# Patient Record
Sex: FEMALE | Employment: OTHER | URBAN - METROPOLITAN AREA
[De-identification: names, ages, dates, MRNs, and addresses within clinical notes are randomized per-mention and may not be internally consistent; named-entity substitution may affect disease eponyms.]

---

## 2017-04-13 ENCOUNTER — OFFICE VISIT (OUTPATIENT)
Dept: FAMILY MEDICINE CLINIC | Age: 82
End: 2017-04-13

## 2017-04-13 VITALS
HEIGHT: 61 IN | DIASTOLIC BLOOD PRESSURE: 90 MMHG | SYSTOLIC BLOOD PRESSURE: 140 MMHG | WEIGHT: 129 LBS | RESPIRATION RATE: 18 BRPM | OXYGEN SATURATION: 97 % | BODY MASS INDEX: 24.35 KG/M2 | HEART RATE: 59 BPM | TEMPERATURE: 96.4 F

## 2017-04-13 DIAGNOSIS — E11.9 TYPE 2 DIABETES MELLITUS WITHOUT COMPLICATION, WITHOUT LONG-TERM CURRENT USE OF INSULIN (HCC): ICD-10-CM

## 2017-04-13 DIAGNOSIS — Z13.39 SCREENING FOR ALCOHOLISM: ICD-10-CM

## 2017-04-13 DIAGNOSIS — E03.9 ACQUIRED HYPOTHYROIDISM: ICD-10-CM

## 2017-04-13 DIAGNOSIS — Z00.00 MEDICARE ANNUAL WELLNESS VISIT, SUBSEQUENT: Primary | ICD-10-CM

## 2017-04-13 DIAGNOSIS — I10 ESSENTIAL HYPERTENSION: ICD-10-CM

## 2017-04-13 DIAGNOSIS — F02.80 ALZHEIMER'S DEMENTIA WITHOUT BEHAVIORAL DISTURBANCE, UNSPECIFIED TIMING OF DEMENTIA ONSET: ICD-10-CM

## 2017-04-13 DIAGNOSIS — G30.9 ALZHEIMER'S DEMENTIA WITHOUT BEHAVIORAL DISTURBANCE, UNSPECIFIED TIMING OF DEMENTIA ONSET: ICD-10-CM

## 2017-04-13 DIAGNOSIS — Z00.00 ROUTINE GENERAL MEDICAL EXAMINATION AT A HEALTH CARE FACILITY: ICD-10-CM

## 2017-04-13 NOTE — PATIENT INSTRUCTIONS
[facility], [address] [place] [address]   [phone]    Contact the above number    Continue atorvastatin for now. But I will be changing this to pravastatin at a lower dose 20mg daily    For diabetes- Start Cinnamon capsules 2 grams a day    Follow up in 1 year     Helping a Person With Alzheimer's Disease: Care Instructions  Your Care Instructions  Alzheimer's disease is a type of dementia. It affects memory, intelligence, judgment, language, and behavior. It is not clear what causes this disease. But it is the most common form of dementia in older adults. It may take many years to develop. Alzheimer's disease is different than mild memory loss that occurs with aging. Family members usually notice symptoms first. But the person also may realize that something is wrong. Follow-up care is a key part of your loved one's treatment and safety. Be sure to make and go to all appointments, and call your doctor if your loved one is having problems. It's also a good idea to know your loved one's test results and keep a list of the medicines he or she takes. How can you care for your loved one at home? · Develop a routine. The person will feel less frustrated or confused with a clear, simple daily plan. Remind him or her about important facts and events. · Be patient. It may take longer for the person to complete a task than it used to. · Help the person eat a balanced diet. Serve plenty of whole grains, fruits, and vegetables every day. If the person is not eating well at mealtimes, give snacks at midmorning and in the afternoon. Offer drinks such as Boost, Ensure, or Sustacal if he or she is losing weight. · Encourage exercise. Walking and other activity may slow the decline of mental ability. Help the person keep an active mind. Encourage hobbies such as reading and crossword puzzles. · Take steps to help if the person is sundowning.  This is the restless behavior and trouble with sleeping that may occur in late afternoon and at night. Try not to let the person nap during the day. Offer a glass of warm milk or caffeine-free tea before bedtime. · Ask family members and friends for help. You may need breaks where others can help care for the person. · Talk to the person's doctor about what resources are available for help in your area. · Review all of the person's medicines with his or her doctor. · For as long as the person is able, allow him or her to make decisions about activities, food, clothing, and other choices. Let the person be independent, even if tasks take more time or are not done perfectly. Tailor tasks to the person's abilities. For example, if cooking is no longer safe, ask for other help. He or she can help set the table or make simple dishes such as a salad. When the person needs help, offer it gently. Keeping safe  · Make your home (or the person's home) safe. Tack down rugs, and put no-slip tape in the tub. Install handrails, and put safety switches on stoves and appliances. Keep rooms free of clutter. Make sure walkways around furniture are clear. Do not move furniture around, because the person may become confused. · Use locks on doors and cupboards. Lock up knives, scissors, medicines, cleaning supplies, and other dangerous things. · Do not let the person drive or cook if he or she cannot do it safely. A person with Alzheimer's should not drive unless he or she is able to pass an on-road driving test. Your state 's license bureau can do a driving test if there is any question. · Get medical alert jewelry for the person so you can be contacted if he or she wanders away. If possible, provide a safe place for wandering, such as an enclosed yard or garden. When should you call for help? Call 911 anytime you think you may need emergency care. For example, call if:  · A person who has Alzheimer's disease has disappeared.   · A person who has Alzheimer's disease is seriously injured. Call your doctor now or seek immediate medical care if:  · The person you are caring for suddenly sees or hears things that are not there (hallucinates). · The person you are caring for has a sudden, drastic change in his or her behavior. Watch closely for changes in your loved one's health, and be sure to contact the doctor if:  · A person who has Alzheimer's disease gradually gets worse or has symptoms that could cause injury. · You need help caring for a person with Alzheimer's disease. · The person has problems with his or her medicine. Where can you learn more? Go to http://benedict-panchito.info/. Enter S876 in the search box to learn more about \"Helping a Person With Alzheimer's Disease: Care Instructions. \"  Current as of: July 26, 2016  Content Version: 11.2  © 7168-1779 Healthwise, Incorporated. Care instructions adapted under license by [employer] (which disclaims liability or warranty for this information). If you have questions about a medical condition or this instruction, always ask your healthcare professional. [address] any warranty or liability for your use of this information.

## 2017-04-13 NOTE — PROGRESS NOTES
Carlin Alarcon is a 80 y.o. female here for [address] Granddaughter is caretaker and is with her today. Unable to complete PHQ2 and Abuse screening, pt is nonverbal.     Fall Risk Assessment, last 12 mths 4/13/2017   Able to walk?  No   Fall in past 12 months? -     ADL Assessment 4/13/2017   Feeding yourself No Help Needed   Getting from bed to chair No Help Needed   Getting dressed Help Needed   Bathing or showering Help Needed   Walk across the room (includes cane/walker) Help Needed   Using the telphone Help Needed   Taking your medications Help Needed   Preparing meals Help Needed   Managing money (expenses/bills) Help Needed   Moderately strenuous housework (laundry) Help Needed   Shopping for personal items (toiletries/medicines) Help Needed   Shopping for groceries Help Needed   Driving Help Needed   Climbing a flight of stairs Help Needed   Getting to places beyond walking distances Help Needed

## 2017-04-13 NOTE — MR AVS SNAPSHOT
Visit Information Date & Time Provider Department Dept. Phone Encounter #  
 4/13/2017  4:00 PM Eyal Ramirez [address]  [phone] [id number] Follow-up Instructions Return in about 1 year (around 4/13/2018) for annual exam. Upcoming Health Maintenance Date Due DTaP/Tdap/Td series (1 - Tdap) 4/26/1951 ZOSTER VACCINE AGE 60> 4/26/1990 GLAUCOMA SCREENING Q2Y 4/26/1995 OSTEOPOROSIS SCREENING (DEXA) 4/26/1995 Pneumococcal 65+ Low/Medium Risk (2 of 2 - PCV13) 12/10/2014 INFLUENZA AGE 9 TO ADULT 8/1/2016 MEDICARE YEARLY EXAM 1/25/2017 Allergies as of 4/13/2017  Review Complete On: 4/13/2017 By: Berna Melendez, CECILIA, RT, NM, ARRT No Known Allergies Current Immunizations  Never Reviewed Name Date Influenza Vaccine 12/18/2014, 12/10/2013, 12/7/2012 Pneumococcal Polysaccharide (PPSV-23) 12/10/2013 Not reviewed this visit You Were Diagnosed With   
  
 Codes Comments Alzheimer's dementia without behavioral disturbance, unspecified timing of dementia onset    -  Primary ICD-10-CM: G30.9, F02.80 ICD-9-CM: 331.0, 294.10 Essential hypertension     ICD-10-CM: I10 
ICD-9-CM: 401.9 Acquired hypothyroidism     ICD-10-CM: E03.9 ICD-9-CM: 796. 9 Type 2 diabetes mellitus without complication, without long-term current use of insulin (HCC)     ICD-10-CM: E11.9 ICD-9-CM: 250.00 Vitals BP Pulse Temp Resp Height(growth percentile) Weight(growth percentile) 140/90 (BP 1 Location: Right arm, BP Patient Position: Sitting) (!) 59 96.4 °F (35.8 °C) (Oral) 18 5' 1\" (1.549 m) 129 lb (58.5 kg) SpO2 BMI OB Status Smoking Status 97% 24.37 kg/m2 Postmenopausal Former Smoker BMI and BSA Data Body Mass Index Body Surface Area  
 24.37 kg/m 2 1.59 m 2 Preferred Pharmacy Pharmacy Name Phone [id number] [phone]  [phone] Your Updated Medication List  
  
   
This list is accurate as of: 4/13/17  5:11 PM.  Always use your most recent med list.  
  
  
  
  
 aspirin delayed-release 81 mg tablet Take  by mouth daily. atorvastatin 40 mg tablet Commonly known as:  LIPITOR  
TAKE 1 TABLET BY MOUTH ONCE A NIGHT  
  
 ciprofloxacin HCl 500 mg tablet Commonly known as:  CIPRO Take 1 Tab by mouth two (2) times a day. docusate sodium 100 mg capsule Commonly known as:  Isadora Ehrich Take 100 mg by mouth two (2) times a day. FLUoxetine 20 mg capsule Commonly known as:  PROzac TAKE 1 CAPSULE BY MOUTH ONCE A DAY  
  
 levothyroxine 88 mcg tablet Commonly known as:  SYNTHROID  
TAKE 1 TABLET BY MOUTH EVERY OTHER DAY  
  
 lisinopril 10 mg tablet Commonly known as:  Nael Headings Take 1 Tab by mouth daily. metoprolol tartrate 25 mg tablet Commonly known as:  LOPRESSOR  
TAKE 1 TABLET BY MOUTH TWICE A DAY Follow-up Instructions Return in about 1 year (around 4/13/2018) for annual exam. To-Do List   
 04/13/2017 Lab:  HEMOGLOBIN A1C WITH EAG   
  
 04/13/2017 Lab:  METABOLIC PANEL, COMPREHENSIVE   
  
 04/13/2017 Lab:  TSH 3RD GENERATION Patient Instructions [facility] [address] [place] [address], Shalom Palmer, [address] [place] [address]  
[phone] Contact the above number Continue atorvastatin for now. But I will be changing this to pravastatin at a lower dose 20mg daily For diabetes- Start Cinnamon capsules 2 grams a day Follow up in 1 year Helping a Person With Alzheimer's Disease: Care Instructions Your Care Instructions Alzheimer's disease is a type of dementia. It affects memory, intelligence, judgment, language, and behavior. It is not clear what causes this disease. But it is the most common form of dementia in older adults. It may take many years to develop.  
Alzheimer's disease is different than mild memory loss that occurs with aging. Family members usually notice symptoms first. But the person also may realize that something is wrong. Follow-up care is a key part of your loved one's treatment and safety. Be sure to make and go to all appointments, and call your doctor if your loved one is having problems. It's also a good idea to know your loved one's test results and keep a list of the medicines he or she takes. How can you care for your loved one at home? · Develop a routine. The person will feel less frustrated or confused with a clear, simple daily plan. Remind him or her about important facts and events. · Be patient. It may take longer for the person to complete a task than it used to. · Help the person eat a balanced diet. Serve plenty of whole grains, fruits, and vegetables every day. If the person is not eating well at mealtimes, give snacks at midmorning and in the afternoon. Offer drinks such as Boost, Ensure, or Sustacal if he or she is losing weight. · Encourage exercise. Walking and other activity may slow the decline of mental ability. Help the person keep an active mind. Encourage hobbies such as reading and crossword puzzles. · Take steps to help if the person is sundowning. This is the restless behavior and trouble with sleeping that may occur in late afternoon and at night. Try not to let the person nap during the day. Offer a glass of warm milk or caffeine-free tea before bedtime. · Ask family members and friends for help. You may need breaks where others can help care for the person. · Talk to the person's doctor about what resources are available for help in your area. · Review all of the person's medicines with his or her doctor. · For as long as the person is able, allow him or her to make decisions about activities, food, clothing, and other choices. Let the person be independent, even if tasks take more time or are not done perfectly. Tailor tasks to the person's abilities. For example, if cooking is no longer safe, ask for other help. He or she can help set the table or make simple dishes such as a salad. When the person needs help, offer it gently. Keeping safe · Make your home (or the person's home) safe. Tack down rugs, and put no-slip tape in the tub. Install handrails, and put safety switches on stoves and appliances. Keep rooms free of clutter. Make sure walkways around furniture are clear. Do not move furniture around, because the person may become confused. · Use locks on doors and cupboards. Lock up knives, scissors, medicines, cleaning supplies, and other dangerous things. · Do not let the person drive or cook if he or she cannot do it safely. A person with Alzheimer's should not drive unless he or she is able to pass an on-road driving test. Your state 's license bureau can do a driving test if there is any question. · Get medical alert jewelry for the person so you can be contacted if he or she wanders away. If possible, provide a safe place for wandering, such as an enclosed yard or garden. When should you call for help? Call 911 anytime you think you may need emergency care. For example, call if: · A person who has Alzheimer's disease has disappeared. · A person who has Alzheimer's disease is seriously injured. Call your doctor now or seek immediate medical care if: · The person you are caring for suddenly sees or hears things that are not there (hallucinates). · The person you are caring for has a sudden, drastic change in his or her behavior. Watch closely for changes in your loved one's health, and be sure to contact the doctor if: · A person who has Alzheimer's disease gradually gets worse or has symptoms that could cause injury. · You need help caring for a person with Alzheimer's disease. · The person has problems with his or her medicine. Where can you learn more? Go to http://benedict-panchito.info/. Enter X299 in the search box to learn more about \"Helping a Person With Alzheimer's Disease: Care Instructions. \" Current as of: July 26, 2016 Content Version: 11.2 © 7758-6564 [employer]. Care instructions adapted under license by [employer] (which disclaims liability or warranty for this information). If you have questions about a medical condition or this instruction, always ask your healthcare professional. [address] any warranty or liability for your use of this information. Introducing [facility]! Rosario Barney introduces [employer] patient portal. Now you can access parts of your medical record, email your doctor's office, and request medication refills online. 1. In your internet browser, go to [email or web address] 2. Click on the First Time User? Click Here link in the Sign In box. You will see the New Member Sign Up page. 3. Enter your [employer] Access Code exactly as it appears below. You will not need to use this code after youve completed the sign-up process. If you do not sign up before the expiration date, you must request a new code. · [employer] Access Code: [email or web address] Expires: 7/12/2017  5:04 PM 
 
4. Enter the last four digits of your Social Security Number (xxxx) and Date of Birth (mm/dd/yyyy) as indicated and click Submit. You will be taken to the next sign-up page. 5. Create a [employer] ID. This will be your [employer] login ID and cannot be changed, so think of one that is secure and easy to remember. 6. Create a [employer] password. You can change your password at any time. 7. Enter your Password Reset Question and Answer. This can be used at a later time if you forget your password. 8. Enter your e-mail address. You will receive e-mail notification when new information is available in [address] 9. Click Sign Up. You can now view and download portions of your medical record. 10. Click the Download Summary menu link to download a portable copy of your medical information. If you have questions, please visit the Frequently Asked Questions section of the [employer] website. Remember, [employer] is NOT to be used for urgent needs. For medical emergencies, dial 911. Now available from your iPhone and Android! Please provide this summary of care documentation to your next provider. Your primary care clinician is listed as Lillian Quach. If you have any questions after today's visit, please call [phone].

## 2017-04-14 NOTE — PROGRESS NOTES
This is a Subsequent Medicare Annual Wellness Visit providing Personalized Prevention Plan Services (PPPS) (Performed 12 months after initial AWV and PPPS )    I have reviewed the patient's medical history in detail and updated the computerized patient record. History     Past Medical History:   Diagnosis Date    Dementia     Diabetes ([facility] [address]     Hypercholesterolemia     Hypertension     Stroke ([facility] [address]     1990'S      Past Surgical History:   Procedure Laterality Date    HX ORTHOPAEDIC Right     R hip fx     Current Outpatient Prescriptions   Medication Sig Dispense Refill    FLUoxetine (PROZAC) 20 mg capsule TAKE 1 CAPSULE BY MOUTH ONCE A DAY 90 Cap 1    metoprolol tartrate (LOPRESSOR) 25 mg tablet TAKE 1 TABLET BY MOUTH TWICE A  Tab 1    levothyroxine (SYNTHROID) 88 mcg tablet TAKE 1 TABLET BY MOUTH EVERY OTHER DAY 90 Tab 0    atorvastatin (LIPITOR) 40 mg tablet TAKE 1 TABLET BY MOUTH ONCE A NIGHT 90 Tab 1    docusate sodium (COLACE) 100 mg capsule Take 100 mg by mouth two (2) times a day.  aspirin delayed-release 81 mg tablet Take  by mouth daily.  ciprofloxacin HCl (CIPRO) 500 mg tablet Take 1 Tab by mouth two (2) times a day. 8 Tab 0    lisinopril (PRINIVIL, ZESTRIL) 10 mg tablet Take 1 Tab by mouth daily. 30 Tab 0     No Known Allergies  No family history on file. Social History   Substance Use Topics    Smoking status: Former Smoker    Smokeless tobacco: Never Used    Alcohol use No     Patient Active Problem List   Diagnosis Code    Medicare annual wellness visit, subsequent Z00.00    Dementia F03.90    Hyperlipidemia E78.5    Essential hypertension I10    Acquired hypothyroidism E03.9    Advanced care planning/counseling discussion Z71.89       Depression Risk Factor Screening:   No flowsheet data found. Alcohol Risk Factor Screening: On any occasion during the past 3 months, have you had more than 3 drinks containing alcohol?   No    Do you average more than 7 drinks per week? No      Functional Ability and Level of Safety:     Hearing Loss   severe-to-profound    Activities of Daily Living   Total assistance. Requires assistance with: feeding, continence, grooming and dressing    Fall Risk     Fall Risk Assessment, last 12 mths 4/13/2017   Able to walk? No   Fall in past 12 months? -     Abuse Screen   Patient is not abused    Review of Systems   Review of systems not obtained due to patient factors. Physical Examination     Evaluation of Cognitive Function:  Mood/affect:  neutral  Appearance: age appropriate and casually dressed  Family member/caregiver input: Granddaughter- concerned for worsening dementia    Visit Vitals    /90 (BP 1 Location: Right arm, BP Patient Position: Sitting)    Pulse (!) 59    Temp 96.4 °F (35.8 °C) (Oral)    Resp 18    Ht 5' 1\" (1.549 m)    Wt 129 lb (58.5 kg)    SpO2 97%    BMI 24.37 kg/m2     General appearance: alert, cooperative, no distress, appears stated age  Head: Normocephalic, without obvious abnormality, atraumatic  Lungs: clear to auscultation bilaterally  Heart: regular rate and rhythm, S1, S2 normal, no murmur, click, rub or gallop  Abdomen: soft, non-tender. Bowel sounds normal. No masses,  no organomegaly  Extremities: extremities normal, atraumatic, no cyanosis or edema  Skin: Skin color, texture, turgor normal. No rashes or lesions    Patient Care Team:  [address] Kana Marx MD as PCP - General (Family Practice)    Advice/Referrals/Counseling   Education and counseling provided:  Are appropriate based on today's review and evaluation      Assessment/Plan       ICD-10-CM ICD-9-CM    1. Medicare annual wellness visit, subsequent Z00.00 V70.0    2. Alzheimer's dementia without behavioral disturbance, unspecified timing of dementia onset G30.9 331.0     F02.80 294.10    3. Essential hypertension C13 481.3 METABOLIC PANEL, COMPREHENSIVE   4. Acquired hypothyroidism E03.9 244.9 TSH 3RD GENERATION   5.  Type 2 diabetes mellitus without complication, without long-term current use of insulin (HCC) E11.9 250.00 HEMOGLOBIN A1C WITH EAG   .

## 2017-06-12 DIAGNOSIS — F03.90 DEMENTIA WITHOUT BEHAVIORAL DISTURBANCE (HCC): ICD-10-CM

## 2017-06-12 DIAGNOSIS — I10 ESSENTIAL HYPERTENSION: ICD-10-CM

## 2017-06-12 DIAGNOSIS — E78.5 HYPERLIPIDEMIA: ICD-10-CM

## 2017-06-12 RX ORDER — ATORVASTATIN CALCIUM 40 MG/1
TABLET, FILM COATED ORAL
Qty: 90 TAB | Refills: 1 | Status: SHIPPED | OUTPATIENT
Start: 2017-06-12 | End: 2017-12-23 | Stop reason: SDUPTHER

## 2017-06-12 RX ORDER — FLUOXETINE HYDROCHLORIDE 20 MG/1
CAPSULE ORAL
Qty: 90 CAP | Refills: 1 | Status: SHIPPED | OUTPATIENT
Start: 2017-06-12 | End: 2017-12-23 | Stop reason: SDUPTHER

## 2017-06-12 RX ORDER — METOPROLOL TARTRATE 25 MG/1
TABLET, FILM COATED ORAL
Qty: 180 TAB | Refills: 1 | Status: SHIPPED | OUTPATIENT
Start: 2017-06-12 | End: 2017-12-23 | Stop reason: SDUPTHER

## 2017-07-18 PROBLEM — S72.002A CLOSED LEFT HIP FRACTURE (HCC): Status: ACTIVE | Noted: 2017-07-18

## 2017-07-18 PROBLEM — S72.009A HIP FRACTURE REQUIRING OPERATIVE REPAIR (HCC): Status: ACTIVE | Noted: 2017-07-18

## 2017-07-25 ENCOUNTER — PATIENT OUTREACH (OUTPATIENT)
Dept: FAMILY MEDICINE CLINIC | Age: 82
End: 2017-07-25

## 2017-07-25 NOTE — PROGRESS NOTES
Patient transferred to [address] [place] [address] on 7/21/17 status post following a 4 day stay at [facility] after a left hip fracture. Will follow prn and once discharged.

## 2017-08-30 ENCOUNTER — TELEPHONE (OUTPATIENT)
Dept: FAMILY MEDICINE CLINIC | Age: 82
End: 2017-08-30

## 2017-09-11 ENCOUNTER — PATIENT OUTREACH (OUTPATIENT)
Dept: FAMILY MEDICINE CLINIC | Age: 82
End: 2017-09-11

## 2017-09-11 NOTE — PROGRESS NOTES
Spoke with staff at [facility]. Patient discharged from rehab facility on 9/6/17. Has appt with Dr. Yanci Cuellar on 9/12/17. Left message for granddaughter to return phone call to office at [phone].

## 2017-09-20 DIAGNOSIS — E03.9 ACQUIRED HYPOTHYROIDISM: ICD-10-CM

## 2017-09-21 RX ORDER — LEVOTHYROXINE SODIUM 88 UG/1
TABLET ORAL
Qty: 90 TAB | Refills: 0 | Status: SHIPPED | OUTPATIENT
Start: 2017-09-21 | End: 2019-01-09

## 2017-10-29 PROBLEM — N39.0 UTI (URINARY TRACT INFECTION): Status: ACTIVE | Noted: 2017-10-29

## 2017-10-29 PROBLEM — G93.40 ACUTE ENCEPHALOPATHY: Status: ACTIVE | Noted: 2017-10-29

## 2017-11-03 ENCOUNTER — PATIENT OUTREACH (OUTPATIENT)
Dept: FAMILY MEDICINE CLINIC | Age: 82
End: 2017-11-03

## 2017-11-03 NOTE — PROGRESS NOTES
Patient transferred to inpatient [facility] unit. Will follow up with patient and family once discharged.

## 2017-11-14 ENCOUNTER — PATIENT OUTREACH (OUTPATIENT)
Dept: FAMILY MEDICINE CLINIC | Age: 82
End: 2017-11-14

## 2017-11-14 NOTE — PROGRESS NOTES
Transitions  of Care    Nurse navigator spoke with Luis Tovar with [facility] in [place]. Care related that patient is receiving skilled care services. Discharge plans have not been completed as she has not been able to communicate with the family as of this time. Nurse Navigator will continue to follow case.

## 2017-11-21 ENCOUNTER — PATIENT OUTREACH (OUTPATIENT)
Dept: FAMILY MEDICINE CLINIC | Age: 82
End: 2017-11-21

## 2017-11-21 NOTE — PROGRESS NOTES
Transitions of [address] Discharge    Nurse Navigator (NN) attempted to contact social work staff at [facility] via  telephone call [phone]. There was no response. A voicemail message was left requesting a  return telephone call. Nurse Navigator contact information provided. Nurse Navigator attempted to call patient at her listed home phone number. A message was heard that I had reached [facility] and to please leave a message. Another message was heard that the voicemail box is full. Nurse Navigator will continue to follow case.

## 2017-11-24 ENCOUNTER — PATIENT OUTREACH (OUTPATIENT)
Dept: FAMILY MEDICINE CLINIC | Age: 82
End: 2017-11-24

## 2017-11-24 RX ORDER — BISMUTH SUBSALICYLATE 262 MG
1 TABLET,CHEWABLE ORAL DAILY
COMMUNITY
End: 2019-01-09

## 2017-11-24 NOTE — PROGRESS NOTES
Nurse Navigator attempted to reach staff with   [facility] in [place], [address] [facility]. There was no answser. Voicemail Message left for Ms. Erika azevedoing  a non-emergency return telephone call. Nurse Navigator called Mr. Blanca Morin. Mr. Tyler Elias answered the phone and stated that he is patient's grand-daughter's . Transition of [address] [place] [address] [facility] [place] Discharge 11-4-17  Transition to SNF:  [facility]       Patient was admitted to [facility] on 10-29-17. Discharge Diagnoses per Discharge Summary of  11-4-17 per Dr. Taz Calderon as follows  (in [place]afar 1721):     1. Acute metabolic encephalopathy, present on admission, resolved. 2.  Urinary tract infection. Will complete 3 more days of Cipro (a total of 7 days of antibiotics). 3.  Acute kidney injury, present on admission, now back to baseline. 4.  Dementia. 5.  Dyslipidemia. 6.  Type 2 diabetes. 7.  Hypertension. 8.  History of stroke. 9.  Hypothyroid. 10.  Depression. Inpatient RRAT = 20    Subjective    Per patient's grandson-in-law Mr. Blanca Morin  Provided the following information:     -Patient is home. - Patient needs much assistance. Patient can feed herself but needs assistance with ASLs and uses diapers full time . - Patient has a home health aide for 6 hours a day. - [facility] and son-in-law are both   employed and work in the [employer]. Patient's [facility] is trying to work on approval for  [employer]" for patient.     -Patient is able to walk with a walker to and from the bathroom. - Medications were not changed at time of discharge from the skilled nursing facility (per Blanca Morin) . Patient has meds. Available. - Patient has been ordered Oxygen in the past but is not currently using Oxygen. Mr. Tyler Elias reports that patient did not keep Oxygen on at the time when it was ordered.     - Mr. Tyler Elias reports that someone from [facility] came out unexpectedly. His wife spoke with this person. Follow Up Appointment:     Per MrAndrei Chavez please follow up with patient's [facility] re: PCP appointment follow up. Avoicemail message has been left for Ms. Tomás Dobson requesting a return telephone call. Med. Reconciliation:     Med. Reconciliatoin completed and allergies reviewed. Telephone Follow Up    Nurse Navigator contacted [facility]. Nurse Navigator was told that patient is not receiving home care services. Nurse Navigator left messages for patient's daughter and son-in-law requesting a non-emergency return telephone call. Telephone call was to follow up with appointment date for PCP and if patient has home care services post dc from SNF. Nurse Navigator will continue to follow case. Goals      Attends follow-up appointments as directed.  Prevent complications post hospitalization.  Supportive resources in place to maintain patient in the community (ie. Home Health, DME equipment, refer to, medication assistant plan, etc.)            11-24-17:  A home health aide is place for 6 hours daily to provide assistance to patient.      11-24-17:  Plan:  Follow up with patient's grand-daughter re: application for Medicaid and possible [facility] for additional services/LTC                        -

## 2017-12-01 ENCOUNTER — PATIENT OUTREACH (OUTPATIENT)
Dept: FAMILY MEDICINE CLINIC | Age: 82
End: 2017-12-01

## 2017-12-01 NOTE — PROGRESS NOTES
Transitions of Care    Nurse Navigator (NN) attempted to contact patient's grand-daughter, Jerica Montague, via telephone call. There was no response. A voicemail message was left requesting a non-emergency return telephone call. Nurse Navigator contact information provided.

## 2017-12-08 ENCOUNTER — PATIENT OUTREACH (OUTPATIENT)
Dept: FAMILY MEDICINE CLINIC | Age: 82
End: 2017-12-08

## 2017-12-08 NOTE — PROGRESS NOTES
Transition of Care    Nurse Navigator spoke with patient's family member Ms. Breezy Mckenna via telephone call. Nurse Navigator introduced self and role. Ms. Minda Samaniego stated that she was currently driving and that she would return call to nurse navigator. Nurse Navigator awaiting return telephone call.

## 2017-12-23 DIAGNOSIS — I10 ESSENTIAL HYPERTENSION: ICD-10-CM

## 2017-12-23 DIAGNOSIS — E78.5 HYPERLIPIDEMIA: ICD-10-CM

## 2017-12-23 DIAGNOSIS — F03.90 DEMENTIA WITHOUT BEHAVIORAL DISTURBANCE (HCC): ICD-10-CM

## 2017-12-26 RX ORDER — FLUOXETINE HYDROCHLORIDE 20 MG/1
CAPSULE ORAL
Qty: 90 CAP | Refills: 1 | Status: SHIPPED | OUTPATIENT
Start: 2017-12-26 | End: 2019-01-09

## 2017-12-26 RX ORDER — ATORVASTATIN CALCIUM 40 MG/1
TABLET, FILM COATED ORAL
Qty: 90 TAB | Refills: 1 | Status: SHIPPED | OUTPATIENT
Start: 2017-12-26 | End: 2019-01-09

## 2017-12-26 RX ORDER — METOPROLOL TARTRATE 25 MG/1
TABLET, FILM COATED ORAL
Qty: 180 TAB | Refills: 1 | Status: SHIPPED | OUTPATIENT
Start: 2017-12-26 | End: 2018-10-18

## 2018-01-03 ENCOUNTER — PATIENT OUTREACH (OUTPATIENT)
Dept: FAMILY MEDICINE CLINIC | Age: 83
End: 2018-01-03

## 2018-01-03 NOTE — PROGRESS NOTES
Transitions of Care    Nurse Navigator spoke with Ms. Didier Mead briefly via telephone call. Ms. Paula Noriega related that she is at work and this was not a good time to speak with her. Nurse navigator related this was not an emergency telephone call. Nurse Navigator contact information provided and requested a return telephone call by Ms. Sheriff when it is an opportune time. Nurse Navigator awaiting return telephone call.

## 2018-01-16 ENCOUNTER — PATIENT OUTREACH (OUTPATIENT)
Dept: FAMILY MEDICINE CLINIC | Age: 83
End: 2018-01-16

## 2018-02-02 ENCOUNTER — PATIENT OUTREACH (OUTPATIENT)
Dept: FAMILY MEDICINE CLINIC | Age: 83
End: 2018-02-02

## 2018-02-02 NOTE — PROGRESS NOTES
Transition of Care    Per EMR review patient has not been admitted to the acute care setting within 30 days from discharge or within the last 30 days. Bharti Yap RN Clinical Manager updated prior to closure. Transitional Care Episode Closed.

## 2018-09-09 PROBLEM — R65.10 SIRS (SYSTEMIC INFLAMMATORY RESPONSE SYNDROME) (HCC): Status: ACTIVE | Noted: 2018-09-09

## 2018-09-17 ENCOUNTER — PATIENT OUTREACH (OUTPATIENT)
Dept: FAMILY MEDICINE CLINIC | Age: 83
End: 2018-09-17

## 2018-09-17 NOTE — PROGRESS NOTES
Hospital Discharge Follow-Up Date/Time:  9/17/2018 11:06 AM 
 
Patient was admitted to [facility] on 9/9/18 and discharged on 9/13/18 for SIRS. The physician discharge summary was available at the time of outreach. Patient was contacted within 4 business days of discharge. Inpatient RRAT score: 14 Was this a readmission? [facility] ED 8/7/18 for acute cystitis/[facility] NN attempted to contact patient/caregiver at listed number. VM left identifying self. Direct contact information given with request for return call. NN will attempt to contact patient/caregiver at later time.

## 2018-09-18 ENCOUNTER — PATIENT OUTREACH (OUTPATIENT)
Dept: FAMILY MEDICINE CLINIC | Age: 83
End: 2018-09-18

## 2018-09-18 NOTE — PROGRESS NOTES
[facility] -18 SIRS 
 
NN attempted to contact patient's granddaughter at listed number. VM left identifying self. Direct contact information given with request for return call. NN will attempt to contact caregiver at later time. Hospital Discharge Follow-Up Date/Time:  2018 4:05 PM 
 
 
 
Nurse Navigator (NN) received return call from the patient's granddaughter, Omega Tong by telephone to perform post hospital discharge assessment. Verified name and  with Miss Rosa Elena Zarate as identifiers. Provided introduction to self, and explanation of the Nurse Navigator role. Reviewed discharge instructions and red flags with Miss Rosa Elena Zarate who verbalized understanding. Miss Rosa Elena Zarate given an opportunity to ask questions and does not have any further questions or concerns at this time. Miss Rosa Elena Zarate agrees to contact the PCP office for questions related to their healthcare. NN provided contact information for future reference. Disease Specific:   Sepsis Summary of patient's top problems: 
1. SIRS - Miss Rosa Elena Zarate states patient is \"doing well\", denies fever, chills Home Health orders at discharge: Miss Rosa Elena Zarate declined Home Health company: NA 
Date of initial visit: NA 
 
Durable Medical Equipment ordered/company: NA 
Durable Medical Equipment received: NA Barriers to care? None noted at this time Advance Care Planning:  
Does patient have an Advance Directive:  not on file Medication(s):  
New Medications at Discharge: yes Changed Medications at Discharge: no 
Discontinued Medications at Discharge: yes: Keflex Medication reconciliation was performed with Miss Rosa Elena Zarate, who verbalizes understanding of administration of home medications. There were no barriers to obtaining medications identified at this time. Referral to Pharm D needed: no  
 
Current Outpatient Prescriptions Medication Sig  potassium chloride SR (K-TAB) 20 mEq tablet Take 1 Tab by mouth daily. Indications: hypokalemia  magnesium oxide 250 mg tablet Take 1 Tab by mouth daily for 5 days.  levETIRAcetam (KEPPRA) 1,000 mg tablet Take 1 Tab by mouth two (2) times a day for 14 days. Indications: PARTIAL EPILEPSY TREATMENT ADJUNCT  
 metoprolol tartrate (LOPRESSOR) 25 mg tablet TAKE 1 TABLET BY MOUTH TWICE A DAY  FLUoxetine (PROZAC) 20 mg capsule TAKE 1 CAPSULE BY MOUTH ONCE A DAY  atorvastatin (LIPITOR) 40 mg tablet TAKE 1 TABLET BY MOUTH ONCE A NIGHT  multivitamin (ONE A DAY) tablet Take 1 Tab by mouth daily.  cinnamon bark (CINNAMON) 500 mg cap Take 1,000 mg by mouth.  levothyroxine (SYNTHROID) 88 mcg tablet TAKE 1 TABLET BY MOUTH EVERY OTHER DAY  docusate sodium (COLACE) 100 mg capsule Take 100 mg by mouth two (2) times a day.  aspirin delayed-release 81 mg tablet Take 81 mg by mouth daily. No current facility-administered medications for this visit. There are no discontinued medications. [facility] follow up appointment(s): Future Appointments Date Time Provider Ethel Porter 9/24/2018 1:00 PM [place] [address] [facility] follow up appointment(s): Dr. Juve Elliott. Linda Milling - 10/22/18 [facility]:  out of service area Goals  Understands red flags post discharge. By 9/25/18 the patient/caregiver will be able to identify signs/symptoms of sepsis Fever > 100.5 Low body temperature < 97.2 Chills or shaking Sweating Fast heart rate Fast breathing Dizziness/lightheadedness Confusion or unusual change in mental status Diarrhea Nausea and/or vomiting Shortness of breath or difficulty breathing Less urine output Cold, clammy, and pale skin Skin rash or skin color changes Patient/caregiver will verbalize understanding that if 2 or more of these symptoms are present then PCP should be notified of concerns for sepsis Miss Dewitt Shed states that there is no need for further follow up calls frmo NN. \"She has an appointment tomorrow, I am giving her the medications as ordered. If there are any changes I have your contact number\". NN will resolve this episode.

## 2018-09-24 ENCOUNTER — HOSPITAL ENCOUNTER (OUTPATIENT)
Dept: LAB | Age: 83
Discharge: HOME | End: 2018-09-24
Payer: MEDICARE

## 2018-09-24 ENCOUNTER — OFFICE VISIT (OUTPATIENT)
Dept: FAMILY MEDICINE CLINIC | Age: 83
End: 2018-09-24

## 2018-09-24 VITALS — SYSTOLIC BLOOD PRESSURE: 146 MMHG | HEART RATE: 55 BPM | DIASTOLIC BLOOD PRESSURE: 42 MMHG

## 2018-09-24 DIAGNOSIS — G40.909 SEIZURE DISORDER (HCC): Primary | ICD-10-CM

## 2018-09-24 DIAGNOSIS — G40.909 SEIZURE DISORDER (HCC): ICD-10-CM

## 2018-09-24 DIAGNOSIS — E87.6 HYPOKALEMIA: ICD-10-CM

## 2018-09-24 DIAGNOSIS — E83.42 HYPOMAGNESEMIA: ICD-10-CM

## 2018-09-24 DIAGNOSIS — F02.80 ALZHEIMER'S DEMENTIA WITHOUT BEHAVIORAL DISTURBANCE, UNSPECIFIED TIMING OF DEMENTIA ONSET: ICD-10-CM

## 2018-09-24 DIAGNOSIS — Z23 ENCOUNTER FOR IMMUNIZATION: ICD-10-CM

## 2018-09-24 DIAGNOSIS — G30.9 ALZHEIMER'S DEMENTIA WITHOUT BEHAVIORAL DISTURBANCE, UNSPECIFIED TIMING OF DEMENTIA ONSET: ICD-10-CM

## 2018-09-24 LAB
ANION GAP SERPL CALC-SCNC: 10 MMOL/L (ref 3–18)
BUN SERPL-MCNC: 18 MG/DL (ref 7–18)
BUN/CREAT SERPL: 11 (ref 12–20)
CALCIUM SERPL-MCNC: 9 MG/DL (ref 8.5–10.1)
CHLORIDE SERPL-SCNC: 99 MMOL/L (ref 100–108)
CO2 SERPL-SCNC: 27 MMOL/L (ref 21–32)
CREAT SERPL-MCNC: 1.6 MG/DL (ref 0.6–1.3)
GLUCOSE SERPL-MCNC: 139 MG/DL (ref 74–99)
MAGNESIUM SERPL-MCNC: 2.5 MG/DL (ref 1.6–2.6)
POTASSIUM SERPL-SCNC: 4.8 MMOL/L (ref 3.5–5.5)
SODIUM SERPL-SCNC: 136 MMOL/L (ref 136–145)

## 2018-09-24 PROCEDURE — 80048 BASIC METABOLIC PNL TOTAL CA: CPT | Performed by: FAMILY MEDICINE

## 2018-09-24 PROCEDURE — 80177 DRUG SCRN QUAN LEVETIRACETAM: CPT | Performed by: FAMILY MEDICINE

## 2018-09-24 PROCEDURE — 83735 ASSAY OF MAGNESIUM: CPT | Performed by: FAMILY MEDICINE

## 2018-09-24 NOTE — PROGRESS NOTES
ROOM # 7 Marilin Matrínez presents today for Chief Complaint Patient presents with  
[facility] Follow Up Pt went to hospital Sept. 09 for seizure. Pt lio states that she has symptoms of an \"episode\" last wednesday Marilin Martínez preferred language for health care discussion is english/other. Is someone accompanying this pt? Yes her great granddaughter Is the patient using any DME equipment during OV? Yes a wheelchair Depression Screening: PHQ over the last two weeks 9/24/2018 1/25/2016 PHQ Not Done Medical Reason (indicate in comments) Medical Reason (indicate in comments) Learning Assessment: 
Learning Assessment 1/25/2016 PRIMARY LEARNER Patient HIGHEST LEVEL OF EDUCATION - PRIMARY LEARNER  SOME COLLEGE  
BARRIERS PRIMARY LEARNER HEARING  
CO-LEARNER CAREGIVER Yes CO-LEARNER NAME Corrinne Orleans R Emelyn Mendez 75 SOME COLLEGE  
BARRIERS CO-LEARNER NONE PRIMARY LANGUAGE ENGLISH  
PRIMARY LANGUAGE CO-LEARNER ENGLISH  
LEARNER PREFERENCE PRIMARY DEMONSTRATION  
LEARNER PREFERENCE CO-LEARNER OTHER (COMMENT) LEARNING SPECIAL TOPICS no  
ANSWERED BY Corrinne Orleans RELATIONSHIP OTHER Abuse Screening: No flowsheet data found. Fall Risk Fall Risk Assessment, last 12 mths 9/24/2018 4/13/2017 1/25/2016 Able to walk? Yes No Yes Fall in past 12 months? No - No  
 
 
Health Maintenance reviewed and discussed per provider. Yes Marilin Martínez is due for Health Maintenance Due Topic Date Due  
 DTaP/Tdap/Td series (1 - Tdap) 04/26/1951  Shingrix Vaccine Age 50> (1 of 2) 04/26/1980  GLAUCOMA SCREENING Q2Y  04/26/1995  Bone Densitometry (Dexa) Screening  04/26/1995  MEDICARE YEARLY EXAM  04/14/2018  Influenza Age 5 to Adult  08/01/2018 Please order/place referral if appropriate. Advance Directive: 1. Do you have an advance directive in place?  Patient Reply: no 
 
 2. If not, would you like material regarding how to put one in place? Patient Reply: no 
 
Coordination of Care: 1. Have you been to the ER, urgent care clinic since your last visit? Hospitalized since your last visit? Yes for seizures two weeks ago 2. Have you seen or consulted any other health care providers outside of the [address] [place] [address] since your last visit? Include any pap smears or colon screening.  no

## 2018-09-24 NOTE — PATIENT INSTRUCTIONS
Please check to see if she had been using her thyroid medication regularly prior to hospital admission. Follow up with neurologist at the end of October. Influenza (Flu) Vaccine: Care Instructions Your Care Instructions Influenza (flu) is an infection in the lungs and breathing passages. It is caused by the influenza virus. There are different strains, or types, of the flu virus every year. The flu comes on quickly. It can cause a cough, stuffy nose, fever, chills, tiredness, and aches and pains. These symptoms may last up to 10 days. The flu can make you feel very sick, but most of the time it doesn't lead to other problems. But it can cause serious problems in people who are older or who have a long-term illness, such as heart disease or diabetes. You can help prevent the flu by getting a flu vaccine every year, as soon as it is available. You cannot get the flu from the vaccine. The vaccine prevents most cases of the flu. But even when the vaccine doesn't prevent the flu, it can make symptoms less severe and reduce the chance of problems from the flu. Sometimes, young children and people who have an immune system problem may have a slight fever or muscle aches or pains 6 to 12 hours after getting the shot. These symptoms usually last 1 or 2 days. Follow-up care is a key part of your treatment and safety. Be sure to make and go to all appointments, and call your doctor if you are having problems. It's also a good idea to know your test results and keep a list of the medicines you take. Who should get the flu vaccine? Everyone age 7 months or older should get a flu vaccine each year. It lowers the chance of getting and spreading the flu. The vaccine is very important for people who are at high risk for getting other health problems from the flu. This includes: · Anyone 48years of age or older. · People who live in a long-term care center, such as a nursing home. · All children 6 months through 25years of age. · Adults and children 6 months and older who have long-term heart or lung problems, such as asthma. · Adults and children 6 months and older who needed medical care or were in a hospital during the past year because of diabetes, chronic kidney disease, or a weak immune system (including HIV or AIDS). · Women who will be pregnant during the flu season. · People who have any condition that can make it hard to breathe or swallow (such as a brain injury or muscle disorders). · People who can give the flu to others who are at high risk for problems from the flu. This includes all health care workers and close contacts of people age 72 or older. Who should not get the flu vaccine? The person who gives the vaccine may tell you not to get it if you: 
· Have a severe allergy to eggs or any part of the vaccine. · Have had a severe reaction to a flu vaccine in the past. 
· Have had Guillain-Barré syndrome (GBS). · Are sick with a fever. (Get the vaccine when symptoms are gone.) How can you care for yourself at home? · If you or your child has a sore arm or a slight fever after the shot, take an over-the-counter pain medicine, such as acetaminophen (Tylenol) or ibuprofen (Advil, Motrin). Read and follow all instructions on the label. Do not give aspirin to anyone younger than 20. It has been linked to Reye syndrome, a serious illness. · Do not take two or more pain medicines at the same time unless the doctor told you to. Many pain medicines have acetaminophen, which is Tylenol. Too much acetaminophen (Tylenol) can be harmful. When should you call for help? Call 911 anytime you think you may need emergency care. For example, call if after getting the flu vaccine: 
  · You have symptoms of a severe reaction to the flu vaccine. Symptoms of a severe reaction may include: ¨ Severe difficulty breathing. ¨ Sudden raised, red areas (hives) all over your body. ¨ Severe lightheadedness.  
 Call your doctor now or seek immediate medical care if after getting the flu vaccine: 
  · You think you are having a reaction to the flu vaccine, such as a new fever.  
 Watch closely for changes in your health, and be sure to contact your doctor if you have any problems. Where can you learn more? Go to http://benedict-panchito.info/. Enter A250 in the search box to learn more about \"Influenza (Flu) Vaccine: Care Instructions. \" Current as of: October 6, 2017 Content Version: 11.7 © 3041-3391 [employer]. Care instructions adapted under license by [employer] (which disclaims liability or warranty for this information). If you have questions about a medical condition or this instruction, always ask your healthcare professional. [address] any warranty or liability for your use of this information.

## 2018-09-24 NOTE — MR AVS SNAPSHOT
[address] [facility] [address] [address] 
[phone] Patient: Verner Schwartz MRN: [id number] THU:4[birth year]10[birth year]12 Visit Information Date & Time Provider Department Dept. Phone Encounter #  
 9/24/2018  1:00 PM Abhay Sewell, [address] [phone] [id number] Follow-up Instructions Return in about 6 months (around 3/24/2019) for hypothyroidism. Upcoming Health Maintenance Date Due DTaP/Tdap/Td series (1 - Tdap) 4/26/1951 Shingrix Vaccine Age 50> (1 of 2) 4/26/1980 GLAUCOMA SCREENING Q2Y 4/26/1995 Bone Densitometry (Dexa) Screening 4/26/1995 MEDICARE YEARLY EXAM 4/14/2018 Influenza Age 5 to Adult 8/1/2018 Allergies as of 9/24/2018  Review Complete On: 9/24/2018 By: Franki Favorite No Known Allergies Current Immunizations  Never Reviewed Name Date Influenza High Dose Vaccine PF  Incomplete Influenza Vaccine 12/18/2014, 12/10/2013, 12/7/2012 Influenza Vaccine (Tri) Adjuvanted 9/24/2018 Pneumococcal Conjugate (PCV-13) 7/21/2017 11:12 AM  
 Pneumococcal Polysaccharide (PPSV-23) 12/10/2013 Not reviewed this visit You Were Diagnosed With   
  
 Codes Comments [facility]    -  Primary ICD-10-CM: F61.908 ICD-9-CM: 345.90 Hypokalemia     ICD-10-CM: E87.6 ICD-9-CM: 276.8 Hypomagnesemia     ICD-10-CM: M99.08 
ICD-9-CM: 275.2 Encounter for immunization     ICD-10-CM: R79 ICD-9-CM: V03.89 Vitals BP Pulse OB Status Smoking Status 146/42 (!) 55 Postmenopausal Former Smoker Preferred Pharmacy Pharmacy Name Phone [address] [facility] [address] [facility] [address] Your Updated Medication List  
  
   
This list is accurate as of 9/24/18  2:10 PM.  Always use your most recent med list.  
  
  
  
  
 aspirin delayed-release 81 mg tablet Take 81 mg by mouth daily. atorvastatin 40 mg tablet Commonly known as:  LIPITOR  
TAKE 1 TABLET BY MOUTH ONCE A NIGHT  
  
 CINNAMON 500 mg Cap Generic drug:  cinnamon bark Take 1,000 mg by mouth. docusate sodium 100 mg capsule Commonly known as:  Blanco April Take 100 mg by mouth two (2) times a day. FLUoxetine 20 mg capsule Commonly known as:  PROzac TAKE 1 CAPSULE BY MOUTH ONCE A DAY  
  
 levETIRAcetam 1,000 mg tablet Commonly known as:  KEPPRA Take 1 Tab by mouth two (2) times a day for 14 days. Indications: PARTIAL EPILEPSY TREATMENT ADJUNCT  
  
 levothyroxine 88 mcg tablet Commonly known as:  SYNTHROID  
TAKE 1 TABLET BY MOUTH EVERY OTHER DAY  
  
 metoprolol tartrate 25 mg tablet Commonly known as:  LOPRESSOR  
TAKE 1 TABLET BY MOUTH TWICE A DAY  
  
 multivitamin tablet Commonly known as:  ONE A DAY Take 1 Tab by mouth daily. potassium chloride SR 20 mEq tablet Commonly known as:  K-TAB Take 1 Tab by mouth daily. Indications: hypokalemia We Performed the Following ADMIN INFLUENZA VIRUS VAC [ [facility]] INFLUENZA VACCINE INACTIVATED (IIV), SUBUNIT, ADJUVANTED, IM [id number] CPT(R)] INFLUENZA VIRUS VACCINE, HIGH DOSE SEASONAL, PRESERVATIVE FREE [02519 CPT(R)] Follow-up Instructions Return in about 6 months (around 3/24/2019) for hypothyroidism. To-Do List   
 09/24/2018 Lab:  LEVETIRACETAM (KEPPRA)   
  
 09/24/2018 Lab:  MAGNESIUM   
  
 09/24/2018 Lab:  METABOLIC PANEL, BASIC Patient Instructions Please check to see if she had been using her thyroid medication regularly prior to hospital admission. Follow up with neurologist at the end of October. Influenza (Flu) Vaccine: Care Instructions Your Care Instructions Influenza (flu) is an infection in the lungs and breathing passages. It is caused by the influenza virus.  There are different strains, or types, of the flu virus every year. The flu comes on quickly. It can cause a cough, stuffy nose, fever, chills, tiredness, and aches and pains. These symptoms may last up to 10 days. The flu can make you feel very sick, but most of the time it doesn't lead to other problems. But it can cause serious problems in people who are older or who have a long-term illness, such as heart disease or diabetes. You can help prevent the flu by getting a flu vaccine every year, as soon as it is available. You cannot get the flu from the vaccine. The vaccine prevents most cases of the flu. But even when the vaccine doesn't prevent the flu, it can make symptoms less severe and reduce the chance of problems from the flu. Sometimes, young children and people who have an immune system problem may have a slight fever or muscle aches or pains 6 to 12 hours after getting the shot. These symptoms usually last 1 or 2 days. Follow-up care is a key part of your treatment and safety. Be sure to make and go to all appointments, and call your doctor if you are having problems. It's also a good idea to know your test results and keep a list of the medicines you take. Who should get the flu vaccine? Everyone age 7 months or older should get a flu vaccine each year. It lowers the chance of getting and spreading the flu. The vaccine is very important for people who are at high risk for getting other health problems from the flu. This includes: · Anyone 48years of age or older. · People who live in a long-term care center, such as a nursing home. · All children 6 months through 25years of age. · Adults and children 6 months and older who have long-term heart or lung problems, such as asthma. · Adults and children 6 months and older who needed medical care or were in a hospital during the past year because of diabetes, chronic kidney disease, or a weak immune system (including HIV or AIDS). · Women who will be pregnant during the flu season. · People who have any condition that can make it hard to breathe or swallow (such as a brain injury or muscle disorders). · People who can give the flu to others who are at high risk for problems from the flu. This includes all health care workers and close contacts of people age 72 or older. Who should not get the flu vaccine? The person who gives the vaccine may tell you not to get it if you: 
· Have a severe allergy to eggs or any part of the vaccine. · Have had a severe reaction to a flu vaccine in the past. 
· Have had Guillain-Barré syndrome (GBS). · Are sick with a fever. (Get the vaccine when symptoms are gone.) How can you care for yourself at home? · If you or your child has a sore arm or a slight fever after the shot, take an over-the-counter pain medicine, such as acetaminophen (Tylenol) or ibuprofen (Advil, Motrin). Read and follow all instructions on the label. Do not give aspirin to anyone younger than 20. It has been linked to Reye syndrome, a serious illness. · Do not take two or more pain medicines at the same time unless the doctor told you to. Many pain medicines have acetaminophen, which is Tylenol. Too much acetaminophen (Tylenol) can be harmful. When should you call for help? Call 911 anytime you think you may need emergency care. For example, call if after getting the flu vaccine: 
  · You have symptoms of a severe reaction to the flu vaccine. Symptoms of a severe reaction may include: ¨ Severe difficulty breathing. ¨ Sudden raised, red areas (hives) all over your body. ¨ Severe lightheadedness.  
 Call your doctor now or seek immediate medical care if after getting the flu vaccine: 
  · You think you are having a reaction to the flu vaccine, such as a new fever.  
 Watch closely for changes in your health, and be sure to contact your doctor if you have any problems. Where can you learn more? Go to http://benedict-panchito.info/. Enter S900 in the search box to learn more about \"Influenza (Flu) Vaccine: Care Instructions. \" Current as of: October 6, 2017 Content Version: 11.7 © 0828-9387 [employer]. Care instructions adapted under license by [employer] (which disclaims liability or warranty for this information). If you have questions about a medical condition or this instruction, always ask your healthcare professional. [address] any warranty or liability for your use of this information. Introducing [facility] [place] [facility]! [facility] introduces [employer] patient portal. Now you can access parts of your medical record, email your doctor's office, and request medication refills online. 1. In your internet browser, go to [email or web address] 2. Click on the First Time User? Click Here link in the Sign In box. You will see the New Member Sign Up page. 3. Enter your [employer] Access Code exactly as it appears below. You will not need to use this code after youve completed the sign-up process. If you do not sign up before the expiration date, you must request a new code. · [employer] Access Code: [email or web address] Expires: 11/6/2018 12:15 AM 
 
4. Enter the last four digits of your Social Security Number (xxxx) and Date of Birth (mm/dd/yyyy) as indicated and click Submit. You will be taken to the next sign-up page. 5. Create a True[employer]Co ID. This will be your True[employer]Co login ID and cannot be changed, so think of one that is secure and easy to remember. 6. Create a [employer] password. You can change your password at any time. 7. Enter your Password Reset Question and Answer. This can be used at a later time if you forget your password. 8. Enter your e-mail address. You will receive e-mail notification when new information is available in [address] 9. Click Sign Up. You can now view and download portions of your medical record. 10. Click the Download Summary menu link to download a portable copy of your medical information. If you have questions, please visit the Frequently Asked Questions section of the [employer]ane website. Remember, [employer] is NOT to be used for urgent needs. For medical emergencies, dial 911. Now available from your iPhone and Android! Please provide this summary of care documentation to your next provider. Your primary care clinician is listed as Marielena David. If you have any questions after today's visit, please call [phone].

## 2018-09-25 LAB — LEVETIRACETAM SERPL-MCNC: 89.4 UG/ML (ref 10–40)

## 2018-09-29 NOTE — PROGRESS NOTES
Hospital Follow Up (Pt went to hospital Sept. 09 for seizure. Pt lio states that she has symptoms of an \"episode\" last wednesday) HPI: Autumn England is a 80 y.o. female PATIENT REFUSED  Here in hospital follow up for new onset seizures. She is accompanied by her great granddaughter who along with her mother are primary care takers. She was admitted to [facility] from 9/9-9/13. She was found on floor at home with seizure like activity and transported via EMS to [facility]. Laboratory workup was conspicuous for markedly elevated TSH of 300. It is unclear if she had been using her thyroid medication regularly before this episode. She had head CT which was normal. EEG was abnormal and consistent with seizure disorder. She was started on Keppra. Blood and urine cultures were negative. She is markedly anemic. She did have slightly low potassium and magnesium during her hospital stay Great granddaughter reports Mrs Suzan Lester has been doing well since discharge. No seizure like activity. Appetite has been variable. Using keppra daily Past Medical History:  
Diagnosis Date  Dementia  Diabetes ([facility]  Hypercholesterolemia  Hypertension  Stroke ([facility] 1990'S Current Outpatient Prescriptions Medication Sig  potassium chloride SR (K-TAB) 20 mEq tablet Take 1 Tab by mouth daily. Indications: hypokalemia  metoprolol tartrate (LOPRESSOR) 25 mg tablet TAKE 1 TABLET BY MOUTH TWICE A DAY  FLUoxetine (PROZAC) 20 mg capsule TAKE 1 CAPSULE BY MOUTH ONCE A DAY  atorvastatin (LIPITOR) 40 mg tablet TAKE 1 TABLET BY MOUTH ONCE A NIGHT  multivitamin (ONE A DAY) tablet Take 1 Tab by mouth daily.  cinnamon bark (CINNAMON) 500 mg cap Take 1,000 mg by mouth.  levothyroxine (SYNTHROID) 88 mcg tablet TAKE 1 TABLET BY MOUTH EVERY OTHER DAY  docusate sodium (COLACE) 100 mg capsule Take 100 mg by mouth two (2) times a day.  aspirin delayed-release 81 mg tablet Take 81 mg by mouth daily. No current facility-administered medications for this visit. No Known Allergies Past Surgical History:  
Procedure Laterality Date  HX ORTHOPAEDIC Right R hip fx History reviewed. No pertinent family history. Social History Social History  Marital status:  Spouse name: N/A  
 Number of children: N/A  
 Years of education: N/A Occupational History  Not on file. Social History Main Topics  Smoking status: Former Smoker  Smokeless tobacco: Never Used  Alcohol use No  
 Drug use: No  
 Sexual activity: Not on file Other Topics Concern  Not on file Social History Narrative Review of Systems Unable to perform ROS: Dementia Visit Vitals  /42  Pulse (!) 55 Physical Exam  
Constitutional: She appears well-developed and well-nourished. No distress. Sitting in wheelchair. Awake Not responsive to questioning HENT:  
Head: Normocephalic and atraumatic. Cardiovascular: Normal rate and regular rhythm. Pulmonary/Chest: Effort normal and breath sounds normal. No respiratory distress. She has no wheezes. She has no rales. Skin: Skin is warm and dry. She is not diaphoretic. Assesment: ICD-10-CM ICD-9-CM 1. Seizure disorder ([facility] G40.909 345.90 LEVETIRACETAM (KEPPRA) 2. Hypokalemia R55.7 666.7 METABOLIC PANEL, BASIC 3. Hypomagnesemia E83.42 275.2 MAGNESIUM 4. Alzheimer's dementia without behavioral disturbance, unspecified timing of dementia onset G30.9 331.0 F02.80 294.10   
5. Encounter for immunization Z23 V03.89 INFLUENZA VIRUS VACCINE, HIGH DOSE SEASONAL, PRESERVATIVE FREE  
   INFLUENZA VACCINE INACTIVATED (IIV), SUBUNIT, ADJUVANTED, IM  
   ADMIN INFLUENZA VIRUS VAC I have not seen Mrs Karla Hernandez in over a year and during this time it appears that her Alzheimers dementia has progressed significantly.  Will check Keppra level today. Follow up with Dr Luis Leung, neurology. Asked that great granddaugher ask mother if Mrs Teena Mallory had been taking her levothyroxine prior to admission. Unlikely the case as I had not filled it in the last year. Recheck electrolytes today. Patient given flu shot today Will need recheck of thyroid labs in 3 weeks I have discussed the diagnosis with the patient and the intended management  The patient has received an after-visit summary and questions were answered concerning future plans. I have discussed medication usage, side effects and warnings with the patient as well. I have reviewed the plan of care with the patient, accepted their input and they are in agreement with the treatment goals. Follow-up Disposition: 
Return in about 6 months (around 3/24/2019) for hypothyroidism.  
 
 
Dorys Alofnso MD 
 
 
 
 
 
 
 
.

## 2018-10-12 PROBLEM — I63.9 CVA (CEREBRAL VASCULAR ACCIDENT) (HCC): Status: ACTIVE | Noted: 2018-10-12

## 2018-10-12 NOTE — PROGRESS NOTES
Spoke with pt's granddaughter, she said she was actually getting ready to call us. She doesn't know what to do with Mrs. Fan, she feel out of bed recently is now she doesn't do anything, her body is \"locked up\". She wants her to be in a long term care facility as she cannot be alone and there is no one to stay with her 24/7. She said last year medicaid told them that she makes too much to go into long term care facility but I advised that they have her re-evaluated since there has been a major change since last year. Also advised that she have her taken the to the hospital. She was on the way home and said will likely do that when she gets there. She wants to know if Dr. Carey Navarro is able to write a letter stating that she should be in a long-term care facility as she is a \"danger to herself\"

## 2018-10-15 ENCOUNTER — TELEPHONE (OUTPATIENT)
Dept: FAMILY MEDICINE CLINIC | Age: 83
End: 2018-10-15

## 2018-10-15 NOTE — TELEPHONE ENCOUNTER
Spoke with pt's granddaughter on Friday, send the following message to Dr. Ariel Madrid:  Spoke with pt's granddaughter, she said she was actually getting ready to call us. She doesn't know what to do with Mrs. Fan, she feel out of bed recently is now she doesn't do anything, her body is \"locked up\". She wants her to be in a long term care facility as she cannot be alone and there is no one to stay with her 24/7. She said last year medicaid told them that she makes too much to go into long term care facility but I advised that they have her re-evaluated since there has been a major change since last year. Also advised that she have her taken the to the hospital. She was on the way home and said will likely do that when she gets there. She wants to know if Dr. Ariel Madrid is able to write a letter stating that she should be in a long-term care facility as she is a \"danger to herself\"    Daughter called today to states that they did take pt to ED, she was admitted [facility]) but she doesn't know what to do about long-term care. Per Dr. Ariel Madrid, advised her to get in contact with a  at [facility] to find out what steps need to be taken for her grandmother to be placed in a long-term care facility. She voiced understanding and will do so.

## 2018-10-24 ENCOUNTER — PATIENT OUTREACH (OUTPATIENT)
Dept: CASE MANAGEMENT | Age: 83
End: 2018-10-24

## 2018-10-31 ENCOUNTER — PATIENT OUTREACH (OUTPATIENT)
Dept: CASE MANAGEMENT | Age: 83
End: 2018-10-31

## 2018-10-31 NOTE — PROGRESS NOTES
Community Care Team Documentation for Patient in [facility]     Patient discharged from [facility] 10/12/2018 - 10/18/2018 to [facility], [facility], on 10/18/2018. Hospital Discharge diagnosis:  Breakthrough seizure  Acute stroke ruled out  Vascular Dementia, alzheimer dementia   Seizure disorder  History of stroke  Hypothyroidism   Hypoalbuminemia  Chronic anemia . SNF Attending Provider:  Berenice Cartagena    Anticipated discharge date from SNF:  11/3/2018      PCP : Tyrell Garcia MD    Nurse Navigator: [address] [place] [address] Team rounds completed, updates provided by facility. Anticipate 11/3/2018 discharge to LTC  Full Code  PT/OT/SP: self-limiting, not ambulating. Max assist. Poor po intake. Flu vaccine given 10/30/2018    Medium Risk            17       Total Score        3 Has Seen PCP in Last 6 Months (Yes=3, No=0)    4 IP Visits Last 12 Months (1-3=4, 4=9, >4=11)    10 Charlson Comorbidity Score (Age + Comorbid Conditions)        Criteria that do not apply:    . Living with Significant Other. Assisted Living. LTAC. SNF. or   Rehab    Patient Length of Stay (>5 days = 3)    Pt.  Coverage (Medicare=5 , Medicaid, or Self-Pay=4)      Active Ambulatory Problems     Diagnosis Date Noted    Medicare annual wellness visit, subsequent 01/25/2016    Dementia 01/25/2016    Hyperlipidemia 01/25/2016    Essential hypertension 01/25/2016    Acquired hypothyroidism 01/25/2016    Advanced care planning/counseling discussion 01/25/2016    Hip fracture requiring operative repair ([address] 07/18/2017    Closed left hip fracture ([address] 07/18/2017    UTI (urinary tract infection) 10/29/2017    Acute encephalopathy 10/29/2017    SIRS (systemic inflammatory response syndrome) ([address] 09/09/2018    CVA (cerebral vascular accident) ([address] 10/12/2018     Resolved Ambulatory Problems     Diagnosis Date Noted    No Resolved Ambulatory Problems Past Medical History:   Diagnosis Date    Dementia     Diabetes ([facility]     Hypercholesterolemia     Hypertension     [facility]

## 2018-10-31 NOTE — PROGRESS NOTES
Community Care Team Documentation for Patient in [facility]     Patient discharged from [facility] 10/12/2018 - 10/18/2018 to [facility], [facility], on 10/18/2018. Hospital Discharge diagnosis:  Breakthrough seizure  Acute stroke ruled out  Vascular Dementia, alzheimer dementia   Seizure disorder  History of stroke  Hypothyroidism   Hypoalbuminemia  Chronic anemia . SNF Attending Provider:  Brandon Bae    Anticipated discharge date from SNF:  11/3/2018      PCP : Jessica Yates MD    Nurse Navigator: [address] [place] [address] Team rounds completed, updates provided by facility. Anticipate 11/3/2018 discharge  Dispo: unkn. Full Code  PT/OT/SP: self limiting. Amb 5 ft. does not initiate self care. Barriers: can not establish contact with family. Medium Risk            17       Total Score        3 Has Seen PCP in Last 6 Months (Yes=3, No=0)    4 IP Visits Last 12 Months (1-3=4, 4=9, >4=11)    10 Charlson Comorbidity Score (Age + Comorbid Conditions)        Criteria that do not apply:    . Living with Significant Other. Assisted Living. LTAC. SNF. or   Rehab    Patient Length of Stay (>5 days = 3)    Pt.  Coverage (Medicare=5 , Medicaid, or Self-Pay=4)      Active Ambulatory Problems     Diagnosis Date Noted    Medicare annual wellness visit, subsequent 01/25/2016    Dementia 01/25/2016    Hyperlipidemia 01/25/2016    Essential hypertension 01/25/2016    Acquired hypothyroidism 01/25/2016    Advanced care planning/counseling discussion 01/25/2016    Hip fracture requiring operative repair ([address] 07/18/2017    Closed left hip fracture ([address] 07/18/2017    UTI (urinary tract infection) 10/29/2017    Acute encephalopathy 10/29/2017    SIRS (systemic inflammatory response syndrome) ([address] 09/09/2018    CVA (cerebral vascular accident) ([address] 10/12/2018     Resolved Ambulatory Problems     Diagnosis Date Noted    No Resolved Ambulatory Problems     Past Medical History:   Diagnosis Date    Dementia     Diabetes ([facility]     Hypercholesterolemia     Hypertension     [facility]

## 2018-11-07 ENCOUNTER — PATIENT OUTREACH (OUTPATIENT)
Dept: CASE MANAGEMENT | Age: 83
End: 2018-11-07

## 2018-11-07 NOTE — PROGRESS NOTES
Community Care Team Documentation for Patient in [facility]     Patient discharged from [facility] 10/12/2018 - 10/18/2018 to [facility], on 10/18/2018. Hospital Discharge diagnosis:  Breakthrough seizure  Acute stroke ruled out  Vascular Dementia, alzheimer dementia   Seizure disorder  History of stroke  Hypothyroidism   Hypoalbuminemia  Chronic anemia . SNF Attending Provider:  Art Franklin    Anticipated discharge date from SNF:  11/3/2018      PCP : Meche Flores MD    Nurse Navigator: [facility] Team rounds completed, updates provided by facility. 11/3/2018 discharged to LTC  Full Code  No changes. Continue to follow for 30 day KANU    Medium Risk            17       Total Score        3 Has Seen PCP in Last 6 Months (Yes=3, No=0)    4 IP Visits Last 12 Months (1-3=4, 4=9, >4=11)    10 Charlson Comorbidity Score (Age + Comorbid Conditions)        Criteria that do not apply:    . Living with Significant Other. Assisted Living. LTAC. SNF. or   Rehab    Patient Length of Stay (>5 days = 3)    Pt.  Coverage (Medicare=5 , Medicaid, or Self-Pay=4)      Active Ambulatory Problems     Diagnosis Date Noted    Medicare annual wellness visit, subsequent 01/25/2016    Dementia 01/25/2016    Hyperlipidemia 01/25/2016    Essential hypertension 01/25/2016    Acquired hypothyroidism 01/25/2016    Advanced care planning/counseling discussion 01/25/2016    Hip fracture requiring operative repair ([address] 07/18/2017    Closed left hip fracture ([address] 07/18/2017    UTI (urinary tract infection) 10/29/2017    Acute encephalopathy 10/29/2017    SIRS (systemic inflammatory response syndrome) ([address] 09/09/2018    CVA (cerebral vascular accident) ([address] 10/12/2018     Resolved Ambulatory Problems     Diagnosis Date Noted    No Resolved Ambulatory Problems     Past Medical History:   Diagnosis Date    Dementia     Diabetes ([facility]     Hypercholesterolemia     Hypertension     Stroke ([facility]

## 2018-12-05 ENCOUNTER — PATIENT OUTREACH (OUTPATIENT)
Dept: CASE MANAGEMENT | Age: 83
End: 2018-12-05

## 2019-01-03 PROBLEM — A41.9 SEPTIC SHOCK (HCC): Status: ACTIVE | Noted: 2019-01-03

## 2019-01-03 PROBLEM — R65.21 SEPTIC SHOCK (HCC): Status: ACTIVE | Noted: 2019-01-03
